# Patient Record
Sex: FEMALE | Race: WHITE
[De-identification: names, ages, dates, MRNs, and addresses within clinical notes are randomized per-mention and may not be internally consistent; named-entity substitution may affect disease eponyms.]

---

## 2018-08-12 ENCOUNTER — HOSPITAL ENCOUNTER (EMERGENCY)
Dept: HOSPITAL 31 - C.ER | Age: 63
Discharge: HOME | End: 2018-08-12
Payer: COMMERCIAL

## 2018-08-12 VITALS — OXYGEN SATURATION: 98 %

## 2018-08-12 VITALS
RESPIRATION RATE: 18 BRPM | HEART RATE: 85 BPM | DIASTOLIC BLOOD PRESSURE: 86 MMHG | TEMPERATURE: 98.6 F | SYSTOLIC BLOOD PRESSURE: 132 MMHG

## 2018-08-12 VITALS — BODY MASS INDEX: 44.1 KG/M2

## 2018-08-12 DIAGNOSIS — J98.8: Primary | ICD-10-CM

## 2018-08-12 DIAGNOSIS — I10: ICD-10-CM

## 2018-08-12 LAB
ALBUMIN SERPL-MCNC: 4.7 G/DL (ref 3.5–5)
ALBUMIN/GLOB SERPL: 1.2 {RATIO} (ref 1–2.1)
ALT SERPL-CCNC: 16 U/L (ref 9–52)
AST SERPL-CCNC: 44 U/L (ref 14–36)
BASOPHILS # BLD AUTO: 0.1 K/UL (ref 0–0.2)
BASOPHILS NFR BLD: 1.1 % (ref 0–2)
BNP SERPL-MCNC: 124 PG/ML (ref 0–900)
BUN SERPL-MCNC: 18 MG/DL (ref 7–17)
CALCIUM SERPL-MCNC: 9.9 MG/DL (ref 8.6–10.4)
EOSINOPHIL # BLD AUTO: 0.2 K/UL (ref 0–0.7)
EOSINOPHIL NFR BLD: 2.7 % (ref 0–4)
ERYTHROCYTE [DISTWIDTH] IN BLOOD BY AUTOMATED COUNT: 13.8 % (ref 11.5–14.5)
GFR NON-AFRICAN AMERICAN: > 60
HGB BLD-MCNC: 14.9 G/DL (ref 11–16)
LYMPHOCYTES # BLD AUTO: 2.5 K/UL (ref 1–4.3)
LYMPHOCYTES NFR BLD AUTO: 28.2 % (ref 20–40)
MCH RBC QN AUTO: 30.1 PG (ref 27–31)
MCHC RBC AUTO-ENTMCNC: 33.6 G/DL (ref 33–37)
MCV RBC AUTO: 89.6 FL (ref 81–99)
MONOCYTES # BLD: 0.5 K/UL (ref 0–0.8)
MONOCYTES NFR BLD: 6.2 % (ref 0–10)
NEUTROPHILS # BLD: 5.4 K/UL (ref 1.8–7)
NEUTROPHILS NFR BLD AUTO: 61.8 % (ref 50–75)
NRBC BLD AUTO-RTO: 0 % (ref 0–2)
PLATELET # BLD: 251 K/UL (ref 130–400)
PMV BLD AUTO: 8.7 FL (ref 7.2–11.7)
RBC # BLD AUTO: 4.96 MIL/UL (ref 3.8–5.2)
WBC # BLD AUTO: 8.8 K/UL (ref 4.8–10.8)

## 2018-08-12 NOTE — RAD
Date of service: 



08/12/2018



PROCEDURE:  CHEST RADIOGRAPH, 1 VIEW



HISTORY:

cough r/o infiltrate



COMPARISON:

None available.



FINDINGS:



LUNGS:

No evidence of focal infiltrate or consolidation in the lungs.



PLEURA:

No pneumothorax or pleural fluid seen.



CARDIOVASCULAR:

Normal.



OSSEOUS STRUCTURES:

No significant abnormalities.



VISUALIZED UPPER ABDOMEN:

Normal.



OTHER FINDINGS:

None. 



IMPRESSION:

No radiographic evidence of pneumonia.

## 2018-08-12 NOTE — C.PDOC
History Of Present Illness


63 year old female presents to the ED complaining of nonproductive cough for 

one week. She states she feel short of breath when she is coughing. Patient 

denies any chest pain, abdominal pain, n/v/d, fever, chills or any other 

symptoms.


Time Seen by Provider: 18 11:17


Chief Complaint (Nursing): Shortness Of Breath


History Per: Patient


History/Exam Limitations: no limitations


Onset/Duration Of Symptoms: Days


Current Symptoms Are (Timing): Still Present


Associated Symptoms: Cough.  denies: Fever, Chills, Vomiting





Past Medical History


Reviewed: Historical Data, Nursing Documentation, Vital Signs


Vital Signs: 


 Last Vital Signs











Temp  98.6 F   18 13:53


 


Pulse  85   18 13:53


 


Resp  18   18 13:53


 


BP  132/86   18 13:53


 


Pulse Ox  98   18 18:11














- Medical History


PMH: HTN


Surgical History: No Surg Hx


Family History: States: No Known Family Hx





- Social History


Hx Alcohol Use: No


Hx Substance Use: No





Review Of Systems


Except As Marked, All Systems Reviewed And Found Negative.


Constitutional: Negative for: Fever, Chills


Cardiovascular: Negative for: Chest Pain


Respiratory: Positive for: Cough


Gastrointestinal: Negative for: Nausea, Vomiting, Abdominal Pain, Diarrhea





Physical Exam





- Physical Exam


Appears: Non-toxic, No Acute Distress


Skin: Warm, Dry


Head: Atraumatic, Normacephalic


Eye(s): bilateral: Normal Inspection


Nose: Normal


Oral Mucosa: Moist


Neck: Supple


Chest: Symmetrical


Cardiovascular: Rhythm Regular


Respiratory: Normal Breath Sounds


Gastrointestinal/Abdominal: Soft, No Tenderness


Extremity: Normal ROM


Neurological/Psych: Oriented x3, Normal Speech


Gait: Steady





ED Course And Treatment





- Laboratory Results


Result Diagrams: 


 18 11:40





 18 11:40


ECG: Interpreted By Me, Viewed By Me


ECG Rhythm: Sinus Rhythm


ECG Interpretation: No Acute Changes


Interpretation Of ECG: Normal axis. Normal intervals.


Rate From EC


O2 Sat by Pulse Oximetry: 98 (RA)


Pulse Ox Interpretation: Normal





- Other Rad


  ** CXR


X-Ray: Viewed By Me, Read By Radiologist


Interpretation: Accession No. : [id number].  Patient Name / ID : CARISSA HERNANDEZ  / [id number].  Exam Date : 2018 11:50:01 ( Approved ).  Study 

Comment :  Sex / Age : F  / 63Y.  Creator : Christel Crandall MD.  Dictator 

: Christel Crandall MD.   :  Approver : Christel Crandall MD.  

Approver2 :  Report Date : 2018 17:14:02.  My Comment :  *****************

******************************************************************.  Date of 

service:  2018.  PROCEDURE:  CHEST RADIOGRAPH, 1 VIEW.  HISTORY:  cough r/

o infiltrate.  COMPARISON:  None available.  FINDINGS:  LUNGS:  No evidence of 

focal infiltrate or consolidation in the lungs.  PLEURA:  No pneumothorax or 

pleural fluid seen.  CARDIOVASCULAR:  Normal.  OSSEOUS STRUCTURES:  No 

significant abnormalities.  VISUALIZED UPPER ABDOMEN:  Normal.  OTHER FINDINGS:

  None.  IMPRESSION:  No radiographic evidence of pneumonia.





Medical Decision Making


Medical Decision Making: 





Orders: 


- EKG


- CXR


- Blood work 








On reassessment, patient is resting comfortably, and is in no acute distress. 

Patient was instructed to follow up with physician/clinic in 1-2 days for 

further evaluation.








Disposition





- Disposition


[facility] 


[facility] - 


Disposition: HOME/ ROUTINE


Disposition Time: 12:50


Condition: GOOD


Additional Instructions: 





DAVID FERRER, thank you for letting us take care of you today. The emergency 

medical care you received today was directed at your acute symptoms. If you 

were prescribed any medication, please fill it and take as directed. It may 

take several days for your symptoms to resolve. Return to the Emergency 

Department if your symptoms worsen, do not improve, or if you have any other 

problems.





Please contact your doctor or call one of the physicians/clinics you have been 

referred to that are listed on the Patient Visit Information form that is 

included in your discharge packet. Bring any paperwork you were given at 

discharge with you along with any medications you are taking to your follow up 

visit. Our treatment cannot replace ongoing medical care by a primary care 

provider outside of the emergency department.





Thank you for allowing the [facility] team to be part of your care today.














Follow up with your primary care doctor in 2-3 days for re-evaluation and 

further management.


Prescriptions: 


Albuterol Sulfate [Ventolin Hfa] 2 puff IH Q4 PRN #1 unit


 PRN Reason: wheeze


Azithromycin [Zithromax] 250 mg PO DAILY #6 tab


Benzonatate [Tessalon Perle] 100 mg PO Q8 PRN #20 capsule


 PRN Reason: Cough


Instructions:  Upper Respiratory Infection (ED)


Forms:  [facility] (English)





- Clinical Impression


Clinical Impression: 


 Respiratory tract infection








- Scribe Statement


The provider has reviewed the documentation as recorded by the Scribe


Pauly Krause








All medical record entries made by the Nicoletteibe were at my direction and 

personally dictated by me. I have reviewed the chart and agree that the record 

accurately reflects my personal performance of the history, physical exam, 

medical decision making, and the department course for this patient. I have 

also personally directed, reviewed, and agree with the discharge instructions 

and disposition.

## 2018-08-13 NOTE — CARD
--------------- APPROVED REPORT --------------





Date of service: 08/12/2018



EKG Measurement

Heart [id number]

[id number]

[id number]

[id number]

[id number]



<Conclusion>

Normal sinus rhythm

Normal ECG